# Patient Record
Sex: MALE | Race: WHITE | NOT HISPANIC OR LATINO | Employment: OTHER | URBAN - METROPOLITAN AREA
[De-identification: names, ages, dates, MRNs, and addresses within clinical notes are randomized per-mention and may not be internally consistent; named-entity substitution may affect disease eponyms.]

---

## 2017-12-11 NOTE — PATIENT DISCUSSION
Cataracts are visually significant and patient is ready to consider surgery; anticipate improved visual acuity but no guarantee of outcome; refer for consult.

## 2020-02-20 ENCOUNTER — NEW PATIENT EMERGENCY (OUTPATIENT)
Dept: URBAN - METROPOLITAN AREA CLINIC 38 | Facility: CLINIC | Age: 30
End: 2020-02-20

## 2020-02-20 DIAGNOSIS — H16.001: ICD-10-CM

## 2020-02-20 PROCEDURE — 99203 OFFICE O/P NEW LOW 30 MIN: CPT

## 2020-02-20 RX ORDER — AMOXICILLIN 500 MG/1: 1 CAPSULE ORAL

## 2020-02-20 RX ORDER — MOXIFLOXACIN HYDROCHLORIDE 5 MG/ML: 1 SOLUTION/ DROPS OPHTHALMIC

## 2020-02-20 RX ORDER — CYCLOPENTOLATE HYDROCHLORIDE 10 MG/ML: 1 SOLUTION/ DROPS OPHTHALMIC

## 2020-02-20 ASSESSMENT — TONOMETRY: OS_IOP_MMHG: 16

## 2020-02-20 ASSESSMENT — VISUAL ACUITY
OD_CC: J1
OS_CC: 20/30
OD_CC: 20/25-1
OS_CC: J1

## 2020-02-22 ENCOUNTER — FOLLOW UP (OUTPATIENT)
Dept: URBAN - METROPOLITAN AREA CLINIC 38 | Facility: CLINIC | Age: 30
End: 2020-02-22

## 2020-02-22 DIAGNOSIS — H16.001: ICD-10-CM

## 2020-02-22 PROCEDURE — 92012 INTRM OPH EXAM EST PATIENT: CPT

## 2020-02-22 ASSESSMENT — TONOMETRY: OS_IOP_MMHG: 16

## 2020-02-22 ASSESSMENT — VISUAL ACUITY
OD_PH: 20/25
OS_CC: 20/20-2
OD_CC: 20/40

## 2020-02-26 ENCOUNTER — FOLLOW UP (OUTPATIENT)
Dept: URBAN - METROPOLITAN AREA CLINIC 38 | Facility: CLINIC | Age: 30
End: 2020-02-26

## 2020-02-26 DIAGNOSIS — H16.001: ICD-10-CM

## 2020-02-26 PROCEDURE — 92012 INTRM OPH EXAM EST PATIENT: CPT

## 2020-02-26 ASSESSMENT — VISUAL ACUITY
OS_CC: 20/25-1
OD_CC: 20/30-2

## 2020-02-28 ENCOUNTER — FOLLOW UP (OUTPATIENT)
Dept: URBAN - METROPOLITAN AREA CLINIC 38 | Facility: CLINIC | Age: 30
End: 2020-02-28

## 2020-02-28 DIAGNOSIS — H16.001: ICD-10-CM

## 2020-02-28 PROCEDURE — 92012 INTRM OPH EXAM EST PATIENT: CPT

## 2020-02-28 RX ORDER — MOXIFLOXACIN HYDROCHLORIDE 5 MG/ML: 1 SOLUTION/ DROPS OPHTHALMIC

## 2020-02-28 RX ORDER — CYCLOPENTOLATE HYDROCHLORIDE 10 MG/ML: 1 SOLUTION/ DROPS OPHTHALMIC

## 2020-02-28 ASSESSMENT — VISUAL ACUITY
OS_CC: 20/25
OD_CC: 20/30-2

## 2020-02-28 ASSESSMENT — TONOMETRY: OS_IOP_MMHG: 15

## 2020-03-03 ENCOUNTER — FOLLOW UP (OUTPATIENT)
Dept: URBAN - METROPOLITAN AREA CLINIC 38 | Facility: CLINIC | Age: 30
End: 2020-03-03

## 2020-03-03 DIAGNOSIS — H16.001: ICD-10-CM

## 2020-03-03 PROCEDURE — 99212 OFFICE O/P EST SF 10 MIN: CPT

## 2020-03-03 ASSESSMENT — VISUAL ACUITY
OS_CC: 20/25
OU_CC: 20/20
OD_CC: 20/30

## 2020-03-07 ENCOUNTER — FOLLOW UP (OUTPATIENT)
Dept: URBAN - METROPOLITAN AREA CLINIC 38 | Facility: CLINIC | Age: 30
End: 2020-03-07

## 2020-03-07 DIAGNOSIS — H16.001: ICD-10-CM

## 2020-03-07 PROCEDURE — 92012 INTRM OPH EXAM EST PATIENT: CPT

## 2020-03-07 RX ORDER — ERYTHROMYCIN 5 MG/G: OINTMENT OPHTHALMIC EVERY EVENING

## 2020-03-07 ASSESSMENT — TONOMETRY: OS_IOP_MMHG: 17

## 2020-03-07 ASSESSMENT — VISUAL ACUITY
OS_CC: 20/20
OD_CC: 20/25

## 2020-03-11 ENCOUNTER — FOLLOW UP (OUTPATIENT)
Dept: URBAN - METROPOLITAN AREA CLINIC 38 | Facility: CLINIC | Age: 30
End: 2020-03-11

## 2020-03-11 DIAGNOSIS — H16.001: ICD-10-CM

## 2020-03-11 PROCEDURE — 99212 OFFICE O/P EST SF 10 MIN: CPT

## 2020-03-11 ASSESSMENT — VISUAL ACUITY
OD_SC: 20/400
OD_PH: 20/20
OS_CC: 20/20
OU_CC: 20/20

## 2020-03-11 ASSESSMENT — TONOMETRY: OD_IOP_MMHG: 17

## 2020-03-12 ENCOUNTER — NEW PATIENT COMPREHENSIVE (OUTPATIENT)
Dept: URBAN - METROPOLITAN AREA CLINIC 39 | Facility: CLINIC | Age: 30
End: 2020-03-12

## 2020-03-12 DIAGNOSIS — H52.203: ICD-10-CM

## 2020-03-12 DIAGNOSIS — H52.13: ICD-10-CM

## 2020-03-12 PROCEDURE — 92310-2 LEVEL 2 CONTACT LENS MANAGEMENT

## 2020-03-12 PROCEDURE — 92004 COMPRE OPH EXAM NEW PT 1/>: CPT

## 2020-03-12 PROCEDURE — 92015 DETERMINE REFRACTIVE STATE: CPT

## 2020-03-12 ASSESSMENT — KERATOMETRY
OD_AXISANGLE_DEGREES: 178
OS_AXISANGLE_DEGREES: 6
OS_K1POWER_DIOPTERS: 43.5
OD_K2POWER_DIOPTERS: 44.5
OS_K2POWER_DIOPTERS: 44.5
OD_K1POWER_DIOPTERS: 43.5
OS_AXISANGLE2_DEGREES: 96
OD_AXISANGLE2_DEGREES: 88

## 2020-03-12 ASSESSMENT — TONOMETRY
OS_IOP_MMHG: 16
OD_IOP_MMHG: 16

## 2020-03-12 ASSESSMENT — VISUAL ACUITY
OD_SC: 20/400
OS_SC: 20/400
OS_SC: J1+
OD_SC: J1+

## 2020-03-13 ENCOUNTER — EST. PATIENT EMERGENCY (OUTPATIENT)
Dept: URBAN - METROPOLITAN AREA CLINIC 38 | Facility: CLINIC | Age: 30
End: 2020-03-13

## 2020-03-13 DIAGNOSIS — H16.001: ICD-10-CM

## 2020-03-13 PROCEDURE — 92012 INTRM OPH EXAM EST PATIENT: CPT

## 2020-03-13 RX ORDER — MOXIFLOXACIN HYDROCHLORIDE 5 MG/ML: 1 SOLUTION/ DROPS OPHTHALMIC

## 2020-03-13 RX ORDER — CYCLOPENTOLATE HYDROCHLORIDE 10 MG/ML: 1 SOLUTION/ DROPS OPHTHALMIC

## 2020-03-13 ASSESSMENT — VISUAL ACUITY
OS_CC: 20/20
OD_PH: 20/40
OD_SC: 20/70

## 2020-03-13 ASSESSMENT — KERATOMETRY
OS_K2POWER_DIOPTERS: 44.5
OS_K1POWER_DIOPTERS: 43.5
OS_AXISANGLE2_DEGREES: 96
OS_AXISANGLE_DEGREES: 6
OD_AXISANGLE_DEGREES: 178
OD_K1POWER_DIOPTERS: 43.5
OD_AXISANGLE2_DEGREES: 88
OD_K2POWER_DIOPTERS: 44.5

## 2020-03-23 ENCOUNTER — FOLLOW UP (OUTPATIENT)
Dept: URBAN - METROPOLITAN AREA CLINIC 38 | Facility: CLINIC | Age: 30
End: 2020-03-23

## 2020-03-23 DIAGNOSIS — H16.001: ICD-10-CM

## 2020-03-23 PROCEDURE — 92012 INTRM OPH EXAM EST PATIENT: CPT

## 2020-03-23 ASSESSMENT — VISUAL ACUITY
OD_CC: 20/20-2
OS_CC: 20/20

## 2020-03-23 ASSESSMENT — KERATOMETRY
OS_AXISANGLE_DEGREES: 6
OD_AXISANGLE_DEGREES: 178
OS_K2POWER_DIOPTERS: 44.5
OD_K2POWER_DIOPTERS: 44.5
OS_AXISANGLE2_DEGREES: 96
OD_AXISANGLE2_DEGREES: 88
OS_K1POWER_DIOPTERS: 43.5
OD_K1POWER_DIOPTERS: 43.5

## 2020-03-23 ASSESSMENT — TONOMETRY: OS_IOP_MMHG: 16

## 2020-04-22 ENCOUNTER — FOLLOW UP (OUTPATIENT)
Dept: URBAN - METROPOLITAN AREA CLINIC 38 | Facility: CLINIC | Age: 30
End: 2020-04-22

## 2020-04-22 DIAGNOSIS — H16.001: ICD-10-CM

## 2020-04-22 PROCEDURE — 99212 OFFICE O/P EST SF 10 MIN: CPT

## 2020-04-22 ASSESSMENT — VISUAL ACUITY
OS_CC: 20/20-1
OD_PH: 20/20
OD_SC: 20/200

## 2020-04-22 ASSESSMENT — KERATOMETRY
OD_AXISANGLE2_DEGREES: 88
OD_K2POWER_DIOPTERS: 44.5
OS_AXISANGLE2_DEGREES: 96
OS_K2POWER_DIOPTERS: 44.5
OS_AXISANGLE_DEGREES: 6
OS_K1POWER_DIOPTERS: 43.5
OD_AXISANGLE_DEGREES: 178
OD_K1POWER_DIOPTERS: 43.5

## 2021-03-11 ASSESSMENT — KERATOMETRY
OS_AXISANGLE2_DEGREES: 96
OD_K1POWER_DIOPTERS: 43.5
OS_AXISANGLE_DEGREES: 6
OD_K2POWER_DIOPTERS: 44.5
OS_K1POWER_DIOPTERS: 43.5
OD_AXISANGLE2_DEGREES: 88
OS_K2POWER_DIOPTERS: 44.5
OD_AXISANGLE_DEGREES: 178

## 2021-03-12 ENCOUNTER — ESTABLISHED COMPREHENSIVE EXAM (OUTPATIENT)
Dept: URBAN - METROPOLITAN AREA CLINIC 39 | Facility: CLINIC | Age: 31
End: 2021-03-12

## 2021-03-12 DIAGNOSIS — H52.13: ICD-10-CM

## 2021-03-12 DIAGNOSIS — H52.223: ICD-10-CM

## 2021-03-12 PROCEDURE — 92015 DETERMINE REFRACTIVE STATE: CPT

## 2021-03-12 PROCEDURE — 92499OP2 OPTOMAP RETINAL SCREENING BOTH EYES

## 2021-03-12 PROCEDURE — 92310-1 LEVEL 1 CONTACT LENS MANAGEMENT

## 2021-03-12 PROCEDURE — 92014 COMPRE OPH EXAM EST PT 1/>: CPT

## 2021-03-12 ASSESSMENT — VISUAL ACUITY
OS_CC: J1+ CL
OD_CC: J1+ CL
OD_SC: J1+
OS_CC: 20/20 CL
OD_SC: 20/400
OS_SC: 20/400
OD_CC: 20/20 CL
OS_SC: J1+

## 2021-03-12 ASSESSMENT — TONOMETRY
OD_IOP_MMHG: 16
OS_IOP_MMHG: 16

## 2021-06-16 ASSESSMENT — KERATOMETRY
OS_AXISANGLE_DEGREES: 6
OS_K1POWER_DIOPTERS: 43.5
OD_K1POWER_DIOPTERS: 43.5
OS_AXISANGLE2_DEGREES: 96
OD_AXISANGLE_DEGREES: 178
OS_K2POWER_DIOPTERS: 44.5
OD_K2POWER_DIOPTERS: 44.5
OD_AXISANGLE2_DEGREES: 88

## 2021-06-17 ENCOUNTER — IOP CHECK (OUTPATIENT)
Dept: URBAN - METROPOLITAN AREA CLINIC 39 | Facility: CLINIC | Age: 31
End: 2021-06-17

## 2021-06-17 DIAGNOSIS — H40.012: ICD-10-CM

## 2021-06-17 PROCEDURE — 92012 INTRM OPH EXAM EST PATIENT: CPT

## 2021-06-17 PROCEDURE — 76514 ECHO EXAM OF EYE THICKNESS: CPT

## 2021-06-17 PROCEDURE — 92133 CPTRZD OPH DX IMG PST SGM ON: CPT

## 2021-06-17 ASSESSMENT — VISUAL ACUITY
OS_CC: J1+ CL
OD_CC: 20/20 CL
OS_CC: 20/20 CL
OD_CC: J1+ CL

## 2021-06-17 ASSESSMENT — TONOMETRY
OS_IOP_MMHG: 14
OD_IOP_MMHG: 14

## 2021-06-17 ASSESSMENT — PACHYMETRY
OD_CT_UM: 545
OS_CT_UM: 545

## 2021-06-28 NOTE — PATIENT DISCUSSION
Try a ZPAK PO with ocusoft lid scrubs and foam. Poss I lUX without improvement or another round of ZPAK.

## 2022-01-25 NOTE — PROCEDURE NOTE: CLINICAL
PROCEDURE NOTE: Excision of Eyelid Lesion Left Lower Lid. Diagnosis: Eyelid Lesion, Uncertain Behavior. Anesthesia: Topical. Prep: Betadine Scrub. Risks, benefits and alternatives discussed. Patient desires to proceed with excision of growth/lesion today. A drop of proparacaine was instilled in the involved eye. A tetracaine drop was used on a cotton tipped applicator and placed on the conjunctiva. The involved area was anesthetized using 1% lidocaine with epi. The lesion was excised using mini Westcotts and forceps and placed in formalin to be sent to pathology. The wound was cauterized to achieve hemostasis. Erythromycin ophthalmic ointment was applied. Post op instructions were given to the patient. The patient tolerated the procedure well and left in good condition. The patient understands to call us for any concerns. Micaela Gonzalez

## 2022-04-05 ASSESSMENT — KERATOMETRY
OS_AXISANGLE2_DEGREES: 96
OS_AXISANGLE_DEGREES: 6
OS_K1POWER_DIOPTERS: 43.5
OD_K1POWER_DIOPTERS: 43.5
OD_AXISANGLE2_DEGREES: 88
OD_K2POWER_DIOPTERS: 44.5
OD_AXISANGLE_DEGREES: 178
OS_K2POWER_DIOPTERS: 44.5

## 2022-04-06 ENCOUNTER — COMPREHENSIVE EXAM (OUTPATIENT)
Dept: URBAN - METROPOLITAN AREA CLINIC 39 | Facility: CLINIC | Age: 32
End: 2022-04-06

## 2022-04-06 DIAGNOSIS — H52.223: ICD-10-CM

## 2022-04-06 DIAGNOSIS — H52.13: ICD-10-CM

## 2022-04-06 PROCEDURE — 92499OP2 OPTOMAP RETINAL SCREENING BOTH EYES

## 2022-04-06 PROCEDURE — 92014 COMPRE OPH EXAM EST PT 1/>: CPT

## 2022-04-06 PROCEDURE — 92310-1 LEVEL 1 CONTACT LENS MANAGEMENT

## 2022-04-06 PROCEDURE — 92015 DETERMINE REFRACTIVE STATE: CPT

## 2022-04-06 ASSESSMENT — VISUAL ACUITY
OD_CC: 20/20 DVO
OU_SC: J1+
OS_SC: J1+
OD_SC: J1+
OD_SC: 20/200
OU_CC: 20/20 DVO
OS_SC: 20/200
OU_SC: 20/200
OS_CC: 20/20 DVO

## 2022-04-06 ASSESSMENT — TONOMETRY
OD_IOP_MMHG: 18
OS_IOP_MMHG: 18

## 2023-04-07 ENCOUNTER — COMPREHENSIVE EXAM (OUTPATIENT)
Dept: URBAN - METROPOLITAN AREA CLINIC 39 | Facility: CLINIC | Age: 33
End: 2023-04-07

## 2023-04-07 DIAGNOSIS — H52.13: ICD-10-CM

## 2023-04-07 DIAGNOSIS — H52.223: ICD-10-CM

## 2023-04-07 PROCEDURE — 92310-1 LEVEL 1 CONTACT LENS MANAGEMENT

## 2023-04-07 PROCEDURE — 92014 COMPRE OPH EXAM EST PT 1/>: CPT

## 2023-04-07 PROCEDURE — 92015 DETERMINE REFRACTIVE STATE: CPT

## 2023-04-07 PROCEDURE — 92499OP2 OPTOMAP RETINAL SCREENING BOTH EYES

## 2023-04-07 ASSESSMENT — KERATOMETRY
OS_AXISANGLE_DEGREES: 6
OD_AXISANGLE_DEGREES: 178
OS_K2POWER_DIOPTERS: 44.5
OD_K2POWER_DIOPTERS: 44.5
OD_AXISANGLE2_DEGREES: 88
OD_K1POWER_DIOPTERS: 43.5
OS_AXISANGLE2_DEGREES: 96
OS_K1POWER_DIOPTERS: 43.5

## 2023-04-07 ASSESSMENT — VISUAL ACUITY
OD_SC: J1+
OS_SC: J1+
OU_SC: J1+
OD_SC: 20/200
OU_SC: 20/200
OS_CC: 20/20
OU_CC: 20/20
OD_CC: 20/20
OS_SC: 20/200

## 2023-04-07 ASSESSMENT — TONOMETRY
OS_IOP_MMHG: 18
OD_IOP_MMHG: 18

## 2024-03-03 NOTE — PATIENT DISCUSSION
Continue eye medications per post op instruction sheet.
Cool compresses.
Despite some risk factors, the patient does not demonstrate definitive evidence of glaucoma at this time.
Doing well s/p cataract surgery.
Kaushik for now.
MONITOR: Patient has narrow angle(s) but these are not deemed to be closeable at this time. However, angle closure is not always predictable and the signs and symptoms of this were discussed. The patient was asked to follow up with repeat gonioscopy as scheduled.
No evidence of recurrence.
OCT next year.
OS 1st. Sym OS: declan OD: TBD vs CV declan OU vs B+ declan OU vs B declan OU.
Patient is doing well post-operatively. The importance of post-op drop compliance was emphasized. Drop schedule reviewed with patient. Patient to call if any visual changes or concerns.
Patient understands condition, prognosis and need for follow up care.
The IOP is in the target range.
lotemax TID OU for 1 week with taper then stop.
Normal for race